# Patient Record
Sex: FEMALE | Race: WHITE | NOT HISPANIC OR LATINO | URBAN - METROPOLITAN AREA
[De-identification: names, ages, dates, MRNs, and addresses within clinical notes are randomized per-mention and may not be internally consistent; named-entity substitution may affect disease eponyms.]

---

## 2024-11-03 ENCOUNTER — EMERGENCY (EMERGENCY)
Facility: HOSPITAL | Age: 69
LOS: 1 days | Discharge: HOME | End: 2024-11-03
Attending: EMERGENCY MEDICINE
Payer: COMMERCIAL

## 2024-11-03 VITALS
HEART RATE: 62 BPM | OXYGEN SATURATION: 100 % | TEMPERATURE: 98 F | RESPIRATION RATE: 18 BRPM | SYSTOLIC BLOOD PRESSURE: 179 MMHG | HEIGHT: 64 IN | DIASTOLIC BLOOD PRESSURE: 89 MMHG | WEIGHT: 128.09 LBS

## 2024-11-03 LAB
ALBUMIN SERPL ELPH-MCNC: 4.6 G/DL — SIGNIFICANT CHANGE UP (ref 3.3–5)
ALP SERPL-CCNC: 89 U/L — SIGNIFICANT CHANGE UP (ref 40–120)
ALT FLD-CCNC: 14 U/L — SIGNIFICANT CHANGE UP (ref 10–45)
ANION GAP SERPL CALC-SCNC: 14 MMOL/L — SIGNIFICANT CHANGE UP (ref 5–17)
APTT BLD: 30.7 SEC — SIGNIFICANT CHANGE UP (ref 24.5–35.6)
AST SERPL-CCNC: 22 U/L — SIGNIFICANT CHANGE UP (ref 10–40)
BASOPHILS # BLD AUTO: 0.02 K/UL — SIGNIFICANT CHANGE UP (ref 0–0.2)
BASOPHILS NFR BLD AUTO: 0.5 % — SIGNIFICANT CHANGE UP (ref 0–2)
BILIRUB SERPL-MCNC: 0.7 MG/DL — SIGNIFICANT CHANGE UP (ref 0.2–1.2)
BUN SERPL-MCNC: 11 MG/DL — SIGNIFICANT CHANGE UP (ref 7–23)
CALCIUM SERPL-MCNC: 9.7 MG/DL — SIGNIFICANT CHANGE UP (ref 8.4–10.5)
CHLORIDE SERPL-SCNC: 102 MMOL/L — SIGNIFICANT CHANGE UP (ref 96–108)
CO2 SERPL-SCNC: 24 MMOL/L — SIGNIFICANT CHANGE UP (ref 22–31)
CREAT SERPL-MCNC: 0.79 MG/DL — SIGNIFICANT CHANGE UP (ref 0.5–1.3)
EGFR: 81 ML/MIN/1.73M2 — SIGNIFICANT CHANGE UP
EOSINOPHIL # BLD AUTO: 0.05 K/UL — SIGNIFICANT CHANGE UP (ref 0–0.5)
EOSINOPHIL NFR BLD AUTO: 1.3 % — SIGNIFICANT CHANGE UP (ref 0–6)
GLUCOSE SERPL-MCNC: 154 MG/DL — HIGH (ref 70–99)
HCT VFR BLD CALC: 45.2 % — HIGH (ref 34.5–45)
HGB BLD-MCNC: 14.9 G/DL — SIGNIFICANT CHANGE UP (ref 11.5–15.5)
IMM GRANULOCYTES NFR BLD AUTO: 0.3 % — SIGNIFICANT CHANGE UP (ref 0–0.9)
INR BLD: 0.94 RATIO — SIGNIFICANT CHANGE UP (ref 0.85–1.16)
LYMPHOCYTES # BLD AUTO: 1.42 K/UL — SIGNIFICANT CHANGE UP (ref 1–3.3)
LYMPHOCYTES # BLD AUTO: 36.6 % — SIGNIFICANT CHANGE UP (ref 13–44)
MCHC RBC-ENTMCNC: 31.2 PG — SIGNIFICANT CHANGE UP (ref 27–34)
MCHC RBC-ENTMCNC: 33 G/DL — SIGNIFICANT CHANGE UP (ref 32–36)
MCV RBC AUTO: 94.8 FL — SIGNIFICANT CHANGE UP (ref 80–100)
MONOCYTES # BLD AUTO: 0.39 K/UL — SIGNIFICANT CHANGE UP (ref 0–0.9)
MONOCYTES NFR BLD AUTO: 10.1 % — SIGNIFICANT CHANGE UP (ref 2–14)
NEUTROPHILS # BLD AUTO: 1.99 K/UL — SIGNIFICANT CHANGE UP (ref 1.8–7.4)
NEUTROPHILS NFR BLD AUTO: 51.2 % — SIGNIFICANT CHANGE UP (ref 43–77)
NRBC # BLD: 0 /100 WBCS — SIGNIFICANT CHANGE UP (ref 0–0)
PLATELET # BLD AUTO: 227 K/UL — SIGNIFICANT CHANGE UP (ref 150–400)
POTASSIUM SERPL-MCNC: 4 MMOL/L — SIGNIFICANT CHANGE UP (ref 3.5–5.3)
POTASSIUM SERPL-SCNC: 4 MMOL/L — SIGNIFICANT CHANGE UP (ref 3.5–5.3)
PROT SERPL-MCNC: 7.7 G/DL — SIGNIFICANT CHANGE UP (ref 6–8.3)
PROTHROM AB SERPL-ACNC: 10.8 SEC — SIGNIFICANT CHANGE UP (ref 9.9–13.4)
RBC # BLD: 4.77 M/UL — SIGNIFICANT CHANGE UP (ref 3.8–5.2)
RBC # FLD: 12.7 % — SIGNIFICANT CHANGE UP (ref 10.3–14.5)
SODIUM SERPL-SCNC: 140 MMOL/L — SIGNIFICANT CHANGE UP (ref 135–145)
TROPONIN T, HIGH SENSITIVITY RESULT: <6 NG/L — SIGNIFICANT CHANGE UP (ref 0–51)
TROPONIN T, HIGH SENSITIVITY RESULT: <6 NG/L — SIGNIFICANT CHANGE UP (ref 0–51)
WBC # BLD: 3.88 K/UL — SIGNIFICANT CHANGE UP (ref 3.8–10.5)
WBC # FLD AUTO: 3.88 K/UL — SIGNIFICANT CHANGE UP (ref 3.8–10.5)

## 2024-11-03 PROCEDURE — 71046 X-RAY EXAM CHEST 2 VIEWS: CPT | Mod: 26

## 2024-11-03 PROCEDURE — 99223 1ST HOSP IP/OBS HIGH 75: CPT

## 2024-11-03 RX ORDER — ASPIRIN/MAG CARB/ALUMINUM AMIN 325 MG
162 TABLET ORAL ONCE
Refills: 0 | Status: COMPLETED | OUTPATIENT
Start: 2024-11-03 | End: 2024-11-03

## 2024-11-03 RX ADMIN — Medication 162 MILLIGRAM(S): at 07:55

## 2024-11-03 NOTE — ED PROVIDER NOTE - OBJECTIVE STATEMENT
68 y/o female PMHx former smoker 2ppd x5 years (quit 40 years ago), HTN has not started lisinopril yet now presenting to the ED with constant non exertional central chest pressure since 5am that awoke pt out of her sleep today. Patient reports the pain radiates to her neck and back. Stated last stress >10 years ago. Father had MI in his 80s. Has not taken aspirin today. Denied prolonged sedentary period, nausea, vomiting, SOB, FAUSTIN, hormonal supplements, syncope, palpitations, diaphoresis, abdominal pain.

## 2024-11-03 NOTE — ED ADULT TRIAGE NOTE - CHIEF COMPLAINT QUOTE
Constant chest pain and pressure since 5:15 this morning, Pt states she feels pain also in her neck and back.

## 2024-11-03 NOTE — ED CDU PROVIDER INITIAL DAY NOTE - ATTENDING APP SHARED VISIT CONTRIBUTION OF CARE
attending Mariely: pt with HTN, former smoker p/w chest pain. Plan for CDU for telemetry monitoring, vital signs q4h, CT coronary, frequent re-evaluations

## 2024-11-03 NOTE — ED ADULT NURSE NOTE - OBJECTIVE STATEMENT
68yo F aaox4 h/o HTN, presents  ambulatory to ED from home, as per pt this morning (0515) sudden onset  a constant  anterior, upper, middle chest pain with radiation to the neck and back , at this time Pt denies SOB, HA, vision changes, n/v/d, fevers chills, abdominal pain. weakness, dizziness Safety and comfort measures initiated- bed placed in lowest position and side rails raised. Pt oriented to call bell system.

## 2024-11-03 NOTE — ED CDU PROVIDER INITIAL DAY NOTE - PROGRESS NOTE DETAILS
Received patient on signout from CDU day PA with plan for CTC tomorrow a.m. for cardiac workup.  Patient reassessed at bedside, feels well.  Reports her previous chest pain is improved.  Denies shortness of breath, dizziness/lightheadedness, headache.  Of note patient has been sinus bradycardia on monitor, resting comfortably. - Joseph Ordaz PA-C

## 2024-11-03 NOTE — ED ADULT NURSE NOTE - ED STAT RN HANDOFF DETAILS
1100 Report given to receiving change of shift TERESITA GOLDEN patient is in no acute distress. Patient vital signs stable, plan of care explained. in purple

## 2024-11-03 NOTE — ED CDU PROVIDER INITIAL DAY NOTE - OBJECTIVE STATEMENT
68 y/o female PMHx former smoker 2ppd x5 years (quit 40 years ago), HTN has not started lisinopril yet now presenting to the ED with constant non exertional central chest pressure since 5am that awoke pt out of her sleep today. Patient reports the pain radiates to her neck and back. Stated last stress >10 years ago. Father had MI in his 80s. Has not taken aspirin today. Denied prolonged sedentary period, nausea, vomiting, SOB, FAUSTIN, hormonal supplements, syncope, palpitations, diaphoresis, abdominal pain.    In the ED VSS.  Labs unremarkable.  Chest x-ray clear.  Plan to observe in the CDU overnight on telemetry and obtain CT coronary for further evaluation of patient's chest pain.

## 2024-11-03 NOTE — ED ADULT NURSE REASSESSMENT NOTE - NS ED NURSE REASSESS COMMENT FT1
Report received from JJ Sanchez. Pt noted to be resting in stretcher, A&Ox4, speaking coherently, following commands, sensory/motor function intact ,NAD noted. Sinus remigio to 50's on CM. Pt endorsing midsternal chest pain at this time, states neck pain present with movement. Pt denies back pain, sob, lightheadedness at this time. Plan of care discussed with pt and verbalizes understanding. Safety and comfort measures maintained.

## 2024-11-03 NOTE — ED PROVIDER NOTE - ATTENDING APP SHARED VISIT CONTRIBUTION OF CARE
attending Mariely: 69yF former smoker 2ppd x5 years (quit 40 years ago), HTN p/w constant non exertional central chest pressure since 5am that awoke pt out of her sleep today. Radiates to her neck and back. Stated last stress >10 years ago. Father had MI in his 80s. Has not taken aspirin today. Exam as above. Will obtain ekg, place on tele, labs including trop, cxr, reassess

## 2024-11-04 VITALS
SYSTOLIC BLOOD PRESSURE: 125 MMHG | OXYGEN SATURATION: 99 % | TEMPERATURE: 98 F | DIASTOLIC BLOOD PRESSURE: 88 MMHG | HEART RATE: 68 BPM | RESPIRATION RATE: 18 BRPM

## 2024-11-04 LAB
A1C WITH ESTIMATED AVERAGE GLUCOSE RESULT: 5.5 % — SIGNIFICANT CHANGE UP (ref 4–5.6)
CHOLEST SERPL-MCNC: 169 MG/DL — SIGNIFICANT CHANGE UP
ESTIMATED AVERAGE GLUCOSE: 111 MG/DL — SIGNIFICANT CHANGE UP (ref 68–114)
HDLC SERPL-MCNC: 71 MG/DL — SIGNIFICANT CHANGE UP
LIPID PNL WITH DIRECT LDL SERPL: 86 MG/DL — SIGNIFICANT CHANGE UP
NON HDL CHOLESTEROL: 97 MG/DL — SIGNIFICANT CHANGE UP
TRIGL SERPL-MCNC: 59 MG/DL — SIGNIFICANT CHANGE UP

## 2024-11-04 PROCEDURE — 85025 COMPLETE CBC W/AUTO DIFF WBC: CPT

## 2024-11-04 PROCEDURE — 75574 CT ANGIO HRT W/3D IMAGE: CPT | Mod: MC

## 2024-11-04 PROCEDURE — 80061 LIPID PANEL: CPT

## 2024-11-04 PROCEDURE — 83036 HEMOGLOBIN GLYCOSYLATED A1C: CPT

## 2024-11-04 PROCEDURE — 80053 COMPREHEN METABOLIC PANEL: CPT

## 2024-11-04 PROCEDURE — 99239 HOSP IP/OBS DSCHRG MGMT >30: CPT

## 2024-11-04 PROCEDURE — G0378: CPT

## 2024-11-04 PROCEDURE — 71046 X-RAY EXAM CHEST 2 VIEWS: CPT

## 2024-11-04 PROCEDURE — 85730 THROMBOPLASTIN TIME PARTIAL: CPT

## 2024-11-04 PROCEDURE — 84484 ASSAY OF TROPONIN QUANT: CPT

## 2024-11-04 PROCEDURE — 99285 EMERGENCY DEPT VISIT HI MDM: CPT | Mod: 25

## 2024-11-04 PROCEDURE — 85610 PROTHROMBIN TIME: CPT

## 2024-11-04 PROCEDURE — 93005 ELECTROCARDIOGRAM TRACING: CPT | Mod: XU

## 2024-11-04 PROCEDURE — 36000 PLACE NEEDLE IN VEIN: CPT | Mod: XU

## 2024-11-04 PROCEDURE — 75574 CT ANGIO HRT W/3D IMAGE: CPT | Mod: 26,MC

## 2024-11-04 NOTE — ED CDU PROVIDER DISPOSITION NOTE - PATIENT PORTAL LINK FT
You can access the FollowMyHealth Patient Portal offered by [facility] by registering at the following website: [email or web address]. By joining [facility]s FollowMyHealth portal, you will also be able to view your health information using other applications (apps) compatible with our system.

## 2024-11-04 NOTE — ED CDU PROVIDER DISPOSITION NOTE - ATTENDING APP SHARED VISIT CONTRIBUTION OF CARE
Patient's CT Coronaries results reviewed and stable. Patient is reassessed, states feeling much better at this time. Discussed discharge plan with outpatient follow up and strict return precautions. RINAUJMALACHI

## 2024-11-04 NOTE — ED ADULT NURSE REASSESSMENT NOTE - NS ED NURSE REASSESS COMMENT FT1
Pt received from JJ Wild at 1900. Pt oriented to CDU and plan of care was discussed. Pt is observed for CP, here for tele, CTC. Pt states chest pressure has improved since earlier today. A&Ox4, obeys commands, ambulatory, independent. Respirations spontaneous and unlabored. Denies SOB, dyspnea, cough, CP, palpitations at this time. On CM, sinus bradycardia, HR 50s. IV site patent, no signs of infiltration noted. Denies N/V/D/C. Pt afebrile, denies chills. Pt resting in bed. Safety & comfort measures maintained. Call bell within reach.

## 2024-11-04 NOTE — ED CDU PROVIDER DISPOSITION NOTE - NSFOLLOWUPINSTRUCTIONS_ED_ALL_ED_FT
Follow up with your primary doctor in 2-3 days for re-evaluation.     Additionally please follow up with either your cardiologist or our cardiology clinic ([phone]) within the next 1 week for further evaluation/management regarding your symptoms.     Show copies of your reports given to you. Recommend Aspirin 81mg over the counter daily until further evaluation.  Take all of your other medications as previously prescribed.     If you develop any worsening or continued chest pain, shortness of breath, dizziness, weakness, abdominal pain, nausea/vomiting or any other concerning symptoms please return to the Emergency Department immediately. Follow up with your primary doctor in 2-3 days for re-evaluation.     Additionally please follow up with either your cardiologist or our cardiology clinic ([phone]) within the next 1 week for further evaluation/management regarding your symptoms.     Show copies of your reports given to you.  Take all of your other medications as previously prescribed.     If you develop any worsening or continued chest pain, shortness of breath, dizziness, weakness, abdominal pain, nausea/vomiting or any other concerning symptoms please return to the Emergency Department immediately.

## 2024-11-04 NOTE — ED CDU PROVIDER DISPOSITION NOTE - NSFOLLOWUPCLINICS_GEN_ALL_ED_FT
Cardiology at [facility]  Cardiology  [address] [place] [address]  Phone: [phone]  Fax:   Follow Up Time: 1-3 Days

## 2024-11-04 NOTE — ED CDU PROVIDER SUBSEQUENT DAY NOTE - CLINICAL SUMMARY MEDICAL DECISION MAKING FREE TEXT BOX
RGUJRAL 70 y/o female PMHx former smoker 2ppd x5 years (quit 40 years ago), HTN presented with chest discomfort. States she feels much better at this time. Denies any abd pain, recent illness. On exam, Patient is awake,alert,oriented x 3. Patient is well appearing and in no acute distress. Patient's chest is clear to ausculation, +s1s2. Abdomen is soft nd/nt +BS. Extremity with no swelling or calf tenderness. Results reviewed and updated patient. Pt is on tele monitor and planned for CT coronaries. Continue to monitor and follow up results.

## 2024-11-04 NOTE — ED CDU PROVIDER SUBSEQUENT DAY NOTE - PROGRESS NOTE DETAILS
Patient sleeping. NAD. No complaints. VSS from previous. - Joseph Ordaz PA-C Patient reassessed at bedside.  Sinus rhythm on telemetry.  Reports feeling much improved, denies any current chest pain, back or neck pain.  Appears very well, plan for CTC this AM.  - Joseph Ordaz PA-C patient signed out to me by previous team. well appearing. states symptoms have much improved. pending CTC. will continue to monitor. CTC reviewed. no acute pathology noted. all results reviewed and discussed with patient. reports feeling well. advised on importance of following up with her pcp and cardiologist. patient in agreement with plan. all questions answered. stable for discharge. discussed with Dr. Del Valle.

## 2024-11-04 NOTE — ED CDU PROVIDER SUBSEQUENT DAY NOTE - HISTORY
69-year-old female, former smoker, past medical history HTN being observed overnight in CDU for chest pressure, awaiting CTC in AM.  No interval changes from previous CDU note.  Ambulatory to bathroom just now without difficulty.  Reports feeling well at this time.  VSS from previous.  No cp/sob at this time. - Joseph Ordaz PA-C

## 2024-11-04 NOTE — ED CDU PROVIDER DISPOSITION NOTE - CLINICAL COURSE
70 y/o female PMHx HTN, former smoker 2ppd x5 years (quit 40 years ago) presented to the ED c/o constant non-exertional central chest pressure since 5am. Pain radiated to her neck and back. Stated last stress >10 years ago. Father had MI in his 80s. Has not taken aspirin today. Denied prolonged sedentary period, nausea, vomiting, SOB, FAUSTIN, hormonal supplements, syncope, palpitations, diaphoresis, abdominal pain.  ED Course: VSS,  Labs unremarkable.  Chest x-ray clear. Troponin was negative. Patient was sent to CDU for overnight observation on telemetry and CTA of coronary arteries. While in CDU patient was sinus bradycardic on tele, no worsening of sxs. CTC performed which showed ________. 68 y/o female PMHx HTN, former smoker 2ppd x5 years (quit 40 years ago) presented to the ED c/o constant non-exertional central chest pressure since 5am. Pain radiated to her neck and back. Stated last stress >10 years ago. Father had MI in his 80s. Has not taken aspirin today. Denied prolonged sedentary period, nausea, vomiting, SOB, FAUSTIN, hormonal supplements, syncope, palpitations, diaphoresis, abdominal pain.  ED Course: VSS,  Labs unremarkable.  Chest x-ray clear. Troponin was negative. Patient was sent to CDU for overnight observation on telemetry and CTA of coronary arteries. While in CDU patient was sinus bradycardic on tele, no worsening of sxs. CTC performed which showed no acute pathology. HR this morning 50-60s. 68 at this time. all results reviewed and discussed with patient. all questions answered. stable for discharge. discussed with ED attending

## 2024-11-04 NOTE — ED ADULT NURSE REASSESSMENT NOTE - NS ED NURSE REASSESS COMMENT FT1
@0700 Pt received from JJ Gonzales. Pt oriented to CDU & plan of care was discussed. Pt A&O x 4. Pt in CDU for  vital signs q4h, monitor on tele, CT coronary, frequent re-evaluations. Pt denies any chest pain, SOB, dizziness or palpitations. Pt on a cardiac monitor in NSR, HR in 80's. V/S stable, pt afebrile,  IV in place, patent and free of signs of infiltration. Pt resting in bed. Safety & comfort measures maintained. Call bell in reach. Will continue to monitor.

## 2024-11-06 PROBLEM — I10 ESSENTIAL (PRIMARY) HYPERTENSION: Chronic | Status: ACTIVE | Noted: 2024-11-04

## 2024-11-19 ENCOUNTER — APPOINTMENT (OUTPATIENT)
Dept: CARDIOLOGY | Facility: CLINIC | Age: 69
End: 2024-11-19
Payer: COMMERCIAL

## 2024-11-19 ENCOUNTER — NON-APPOINTMENT (OUTPATIENT)
Age: 69
End: 2024-11-19

## 2024-11-19 VITALS
HEIGHT: 64 IN | BODY MASS INDEX: 21.85 KG/M2 | OXYGEN SATURATION: 99 % | WEIGHT: 128 LBS | DIASTOLIC BLOOD PRESSURE: 81 MMHG | SYSTOLIC BLOOD PRESSURE: 154 MMHG | HEART RATE: 55 BPM

## 2024-11-19 DIAGNOSIS — Z83.3 FAMILY HISTORY OF DIABETES MELLITUS: ICD-10-CM

## 2024-11-19 DIAGNOSIS — Z82.49 FAMILY HISTORY OF ISCHEMIC HEART DISEASE AND OTHER DISEASES OF THE CIRCULATORY SYSTEM: ICD-10-CM

## 2024-11-19 DIAGNOSIS — E04.1 NONTOXIC SINGLE THYROID NODULE: ICD-10-CM

## 2024-11-19 DIAGNOSIS — I10 ESSENTIAL (PRIMARY) HYPERTENSION: ICD-10-CM

## 2024-11-19 DIAGNOSIS — K21.9 GASTRO-ESOPHAGEAL REFLUX DISEASE W/OUT ESOPHAGITIS: ICD-10-CM

## 2024-11-19 DIAGNOSIS — R07.89 OTHER CHEST PAIN: ICD-10-CM

## 2024-11-19 DIAGNOSIS — I70.0 ATHEROSCLEROSIS OF AORTA: ICD-10-CM

## 2024-11-19 PROBLEM — Z00.00 ENCOUNTER FOR PREVENTIVE HEALTH EXAMINATION: Status: ACTIVE | Noted: 2024-11-19

## 2024-11-19 PROCEDURE — 99204 OFFICE O/P NEW MOD 45 MIN: CPT | Mod: 25

## 2024-11-19 PROCEDURE — 93000 ELECTROCARDIOGRAM COMPLETE: CPT

## 2024-11-19 RX ORDER — LISINOPRIL 2.5 MG/1
2.5 TABLET ORAL
Qty: 90 | Refills: 3 | Status: ACTIVE | COMMUNITY

## 2024-11-21 PROBLEM — I10 BENIGN ESSENTIAL HYPERTENSION: Status: ACTIVE | Noted: 2024-11-19

## 2024-11-21 PROBLEM — I70.0 AORTIC ATHEROSCLEROSIS: Status: ACTIVE | Noted: 2024-11-21

## 2024-12-26 DIAGNOSIS — R07.89 OTHER CHEST PAIN: ICD-10-CM

## 2024-12-30 ENCOUNTER — OUTPATIENT (OUTPATIENT)
Dept: OUTPATIENT SERVICES | Facility: HOSPITAL | Age: 69
LOS: 1 days | End: 2024-12-30

## 2024-12-30 ENCOUNTER — APPOINTMENT (OUTPATIENT)
Dept: CV DIAGNOSITCS | Facility: HOSPITAL | Age: 69
End: 2024-12-30

## 2024-12-30 ENCOUNTER — RESULT REVIEW (OUTPATIENT)
Age: 69
End: 2024-12-30

## 2024-12-30 DIAGNOSIS — R07.89 OTHER CHEST PAIN: ICD-10-CM

## 2024-12-30 PROCEDURE — 76376 3D RENDER W/INTRP POSTPROCES: CPT

## 2024-12-30 PROCEDURE — 93356 MYOCRD STRAIN IMG SPCKL TRCK: CPT

## 2024-12-30 PROCEDURE — 93306 TTE W/DOPPLER COMPLETE: CPT | Mod: 26

## 2024-12-30 PROCEDURE — 93306 TTE W/DOPPLER COMPLETE: CPT

## 2025-01-07 ENCOUNTER — APPOINTMENT (OUTPATIENT)
Dept: CARDIOLOGY | Facility: CLINIC | Age: 70
End: 2025-01-07
Payer: COMMERCIAL

## 2025-01-07 ENCOUNTER — NON-APPOINTMENT (OUTPATIENT)
Age: 70
End: 2025-01-07

## 2025-01-07 VITALS
WEIGHT: 128 LBS | OXYGEN SATURATION: 100 % | SYSTOLIC BLOOD PRESSURE: 154 MMHG | HEART RATE: 57 BPM | DIASTOLIC BLOOD PRESSURE: 91 MMHG | BODY MASS INDEX: 21.97 KG/M2

## 2025-01-07 DIAGNOSIS — R07.89 OTHER CHEST PAIN: ICD-10-CM

## 2025-01-07 DIAGNOSIS — I10 ESSENTIAL (PRIMARY) HYPERTENSION: ICD-10-CM

## 2025-01-07 DIAGNOSIS — I70.0 ATHEROSCLEROSIS OF AORTA: ICD-10-CM

## 2025-01-07 PROCEDURE — 93000 ELECTROCARDIOGRAM COMPLETE: CPT

## 2025-01-07 PROCEDURE — 99213 OFFICE O/P EST LOW 20 MIN: CPT | Mod: 25

## 2025-01-17 ENCOUNTER — NON-APPOINTMENT (OUTPATIENT)
Age: 70
End: 2025-01-17

## 2025-07-22 ENCOUNTER — APPOINTMENT (OUTPATIENT)
Dept: CARDIOLOGY | Facility: CLINIC | Age: 70
End: 2025-07-22
Payer: COMMERCIAL

## 2025-07-22 VITALS
HEART RATE: 53 BPM | HEIGHT: 64 IN | DIASTOLIC BLOOD PRESSURE: 81 MMHG | BODY MASS INDEX: 20.83 KG/M2 | SYSTOLIC BLOOD PRESSURE: 125 MMHG | WEIGHT: 122 LBS | OXYGEN SATURATION: 98 %

## 2025-07-22 DIAGNOSIS — I10 ESSENTIAL (PRIMARY) HYPERTENSION: ICD-10-CM

## 2025-07-22 DIAGNOSIS — I70.0 ATHEROSCLEROSIS OF AORTA: ICD-10-CM

## 2025-07-22 PROCEDURE — 93000 ELECTROCARDIOGRAM COMPLETE: CPT

## 2025-07-22 PROCEDURE — 99213 OFFICE O/P EST LOW 20 MIN: CPT | Mod: 25
